# Patient Record
Sex: FEMALE | URBAN - METROPOLITAN AREA
[De-identification: names, ages, dates, MRNs, and addresses within clinical notes are randomized per-mention and may not be internally consistent; named-entity substitution may affect disease eponyms.]

---

## 2021-04-21 ENCOUNTER — APPOINTMENT (OUTPATIENT)
Dept: URBAN - METROPOLITAN AREA CLINIC 259 | Age: 76
Setting detail: DERMATOLOGY
End: 2021-04-21

## 2021-04-21 DIAGNOSIS — L57.0 ACTINIC KERATOSIS: ICD-10-CM

## 2021-04-21 DIAGNOSIS — Z71.89 OTHER SPECIFIED COUNSELING: ICD-10-CM

## 2021-04-21 DIAGNOSIS — L85.3 XEROSIS CUTIS: ICD-10-CM

## 2021-04-21 DIAGNOSIS — D18.0 HEMANGIOMA: ICD-10-CM

## 2021-04-21 DIAGNOSIS — L82.1 OTHER SEBORRHEIC KERATOSIS: ICD-10-CM

## 2021-04-21 DIAGNOSIS — L81.4 OTHER MELANIN HYPERPIGMENTATION: ICD-10-CM

## 2021-04-21 DIAGNOSIS — L56.5 DISSEMINATED SUPERFICIAL ACTINIC POROKERATOSIS (DSAP): ICD-10-CM

## 2021-04-21 DIAGNOSIS — L57.8 OTHER SKIN CHANGES DUE TO CHRONIC EXPOSURE TO NONIONIZING RADIATION: ICD-10-CM

## 2021-04-21 DIAGNOSIS — D22 MELANOCYTIC NEVI: ICD-10-CM

## 2021-04-21 PROBLEM — D22.5 MELANOCYTIC NEVI OF TRUNK: Status: ACTIVE | Noted: 2021-04-21

## 2021-04-21 PROBLEM — D18.01 HEMANGIOMA OF SKIN AND SUBCUTANEOUS TISSUE: Status: ACTIVE | Noted: 2021-04-21

## 2021-04-21 PROCEDURE — OTHER MIPS QUALITY: OTHER

## 2021-04-21 PROCEDURE — 99214 OFFICE O/P EST MOD 30 MIN: CPT | Mod: 25

## 2021-04-21 PROCEDURE — 17000 DESTRUCT PREMALG LESION: CPT

## 2021-04-21 PROCEDURE — OTHER COUNSELING: OTHER

## 2021-04-21 PROCEDURE — OTHER LIQUID NITROGEN: OTHER

## 2021-04-21 PROCEDURE — 17003 DESTRUCT PREMALG LES 2-14: CPT

## 2021-04-21 ASSESSMENT — LOCATION SIMPLE DESCRIPTION DERM
LOCATION SIMPLE: RIGHT FOREHEAD
LOCATION SIMPLE: RIGHT UPPER BACK
LOCATION SIMPLE: UPPER BACK
LOCATION SIMPLE: LEFT CALF
LOCATION SIMPLE: LEFT CHEEK
LOCATION SIMPLE: LEFT NOSE
LOCATION SIMPLE: LEFT FOREARM
LOCATION SIMPLE: LEFT UPPER BACK

## 2021-04-21 ASSESSMENT — LOCATION ZONE DERM
LOCATION ZONE: TRUNK
LOCATION ZONE: NOSE
LOCATION ZONE: ARM
LOCATION ZONE: FACE
LOCATION ZONE: LEG

## 2021-04-21 ASSESSMENT — LOCATION DETAILED DESCRIPTION DERM
LOCATION DETAILED: LEFT VENTRAL LATERAL PROXIMAL FOREARM
LOCATION DETAILED: LEFT CENTRAL MALAR CHEEK
LOCATION DETAILED: LEFT NASAL SIDEWALL
LOCATION DETAILED: RIGHT SUPERIOR MEDIAL UPPER BACK
LOCATION DETAILED: INFERIOR THORACIC SPINE
LOCATION DETAILED: LEFT INFERIOR MEDIAL UPPER BACK
LOCATION DETAILED: LEFT VENTRAL PROXIMAL FOREARM
LOCATION DETAILED: LEFT PROXIMAL CALF
LOCATION DETAILED: LEFT MEDIAL UPPER BACK
LOCATION DETAILED: RIGHT INFERIOR LATERAL FOREHEAD

## 2021-04-21 NOTE — PROCEDURE: MIPS QUALITY
Quality 130: Documentation Of Current Medications In The Medical Record: Current Medications Documented
Detail Level: Detailed
Quality 431: Preventive Care And Screening: Unhealthy Alcohol Use - Screening: Patient screened for unhealthy alcohol use using a single question and scores less than 2 times per year
Quality 226: Preventive Care And Screening: Tobacco Use: Screening And Cessation Intervention: Patient screened for tobacco use and is an ex/non-smoker
Quality 111:Pneumonia Vaccination Status For Older Adults: Pneumococcal Vaccination not Administered or Previously Received, Reason not Otherwise Specified
Quality 110: Preventive Care And Screening: Influenza Immunization: Influenza Immunization Ordered or Recommended, but not Administered due to system reason

## 2021-04-21 NOTE — PROCEDURE: LIQUID NITROGEN
Duration Of Freeze Thaw-Cycle (Seconds): 1
Detail Level: Detailed
Render Note In Bullet Format When Appropriate: No
Number Of Freeze-Thaw Cycles: 1 freeze-thaw cycle
Consent: The patient's consent was obtained including but not limited to risks of crusting, scabbing, blistering, scarring, darker or lighter pigmentary change, recurrence, incomplete removal and infection.
Post-Care Instructions: I reviewed with the patient in detail post-care instructions. Patient is to wear sunprotection, and avoid picking at any of the treated lesions. Pt may apply Vaseline to crusted or scabbing areas.

## 2022-05-11 ENCOUNTER — APPOINTMENT (OUTPATIENT)
Dept: URBAN - METROPOLITAN AREA CLINIC 259 | Age: 77
Setting detail: DERMATOLOGY
End: 2022-05-11

## 2022-05-11 DIAGNOSIS — L81.4 OTHER MELANIN HYPERPIGMENTATION: ICD-10-CM

## 2022-05-11 DIAGNOSIS — L82.1 OTHER SEBORRHEIC KERATOSIS: ICD-10-CM

## 2022-05-11 DIAGNOSIS — L82.0 INFLAMED SEBORRHEIC KERATOSIS: ICD-10-CM

## 2022-05-11 DIAGNOSIS — D18.0 HEMANGIOMA: ICD-10-CM

## 2022-05-11 DIAGNOSIS — L57.8 OTHER SKIN CHANGES DUE TO CHRONIC EXPOSURE TO NONIONIZING RADIATION: ICD-10-CM

## 2022-05-11 DIAGNOSIS — L57.0 ACTINIC KERATOSIS: ICD-10-CM

## 2022-05-11 DIAGNOSIS — L71.8 OTHER ROSACEA: ICD-10-CM

## 2022-05-11 DIAGNOSIS — Z71.89 OTHER SPECIFIED COUNSELING: ICD-10-CM

## 2022-05-11 DIAGNOSIS — D22 MELANOCYTIC NEVI: ICD-10-CM

## 2022-05-11 PROBLEM — D18.01 HEMANGIOMA OF SKIN AND SUBCUTANEOUS TISSUE: Status: ACTIVE | Noted: 2022-05-11

## 2022-05-11 PROBLEM — D22.5 MELANOCYTIC NEVI OF TRUNK: Status: ACTIVE | Noted: 2022-05-11

## 2022-05-11 PROCEDURE — 99214 OFFICE O/P EST MOD 30 MIN: CPT | Mod: 25

## 2022-05-11 PROCEDURE — 17110 DESTRUCT B9 LESION 1-14: CPT

## 2022-05-11 PROCEDURE — OTHER COUNSELING: OTHER

## 2022-05-11 PROCEDURE — OTHER LIQUID NITROGEN: OTHER

## 2022-05-11 PROCEDURE — OTHER MIPS QUALITY: OTHER

## 2022-05-11 PROCEDURE — OTHER PRESCRIPTION MEDICATION MANAGEMENT: OTHER

## 2022-05-11 PROCEDURE — 17000 DESTRUCT PREMALG LESION: CPT | Mod: 59

## 2022-05-11 ASSESSMENT — LOCATION SIMPLE DESCRIPTION DERM
LOCATION SIMPLE: RIGHT SHOULDER
LOCATION SIMPLE: RIGHT UPPER BACK
LOCATION SIMPLE: CHEST
LOCATION SIMPLE: UPPER BACK
LOCATION SIMPLE: LEFT UPPER BACK
LOCATION SIMPLE: RIGHT CHEEK

## 2022-05-11 ASSESSMENT — LOCATION DETAILED DESCRIPTION DERM
LOCATION DETAILED: RIGHT MEDIAL SUPERIOR CHEST
LOCATION DETAILED: RIGHT SUPERIOR MEDIAL UPPER BACK
LOCATION DETAILED: INFERIOR THORACIC SPINE
LOCATION DETAILED: LEFT INFERIOR MEDIAL UPPER BACK
LOCATION DETAILED: RIGHT ANTERIOR SHOULDER
LOCATION DETAILED: RIGHT MEDIAL MALAR CHEEK
LOCATION DETAILED: LEFT MEDIAL UPPER BACK

## 2022-05-11 ASSESSMENT — LOCATION ZONE DERM
LOCATION ZONE: TRUNK
LOCATION ZONE: FACE
LOCATION ZONE: ARM

## 2022-05-11 NOTE — PROCEDURE: LIQUID NITROGEN
Duration Of Freeze Thaw-Cycle (Seconds): 0
Consent: The patient's consent was obtained including but not limited to risks of crusting, scabbing, blistering, scarring, darker or lighter pigmentary change, recurrence, incomplete removal and infection.
Render In Bullet Format When Appropriate: No
Spray Paint Text: The liquid nitrogen was applied to the skin utilizing a spray paint frosting technique.
Medical Necessity Clause: This procedure was medically necessary because the lesions that were treated were:
Post-Care Instructions: I reviewed with the patient in detail post-care instructions. Patient is to wear sunprotection, and avoid picking at any of the treated lesions. Pt may apply Vaseline to crusted or scabbing areas.
Total Number Of Lesions Treated: 10
Render Post Care In The Note?: yes
Medical Necessity Information: It is in your best interest to select a reason for this procedure from the list below. All of these items fulfill various CMS LCD requirements except the new and changing color options.
Detail Level: Generalized
Number Of Freeze-Thaw Cycles: 1 freeze-thaw cycle
Duration Of Freeze Thaw-Cycle (Seconds): 1
Detail Level: Zone

## 2022-05-11 NOTE — PROCEDURE: PRESCRIPTION MEDICATION MANAGEMENT
Render In Strict Bullet Format?: No
Detail Level: Zone
Plan: Patient can call clinic for metrocream as needed.

## 2022-05-11 NOTE — PROCEDURE: MIPS QUALITY
Quality 431: Preventive Care And Screening: Unhealthy Alcohol Use - Screening: Patient not identified as an unhealthy alcohol user when screened for unhealthy alcohol use using a systematic screening method
Quality 110: Preventive Care And Screening: Influenza Immunization: Influenza Immunization Ordered or Recommended, but not Administered due to system reason
Quality 130: Documentation Of Current Medications In The Medical Record: Current Medications Documented
Detail Level: Generalized
Quality 226: Preventive Care And Screening: Tobacco Use: Screening And Cessation Intervention: Patient screened for tobacco use and is an ex/non-smoker

## 2023-09-17 ENCOUNTER — TRANSCRIBE ORDERS (OUTPATIENT)
Dept: OTHER | Age: 78
End: 2023-09-17

## 2023-09-17 DIAGNOSIS — D05.11 DUCTAL CARCINOMA IN SITU (DCIS) OF RIGHT BREAST: Primary | ICD-10-CM

## 2023-09-18 ENCOUNTER — PATIENT OUTREACH (OUTPATIENT)
Dept: ONCOLOGY | Facility: CLINIC | Age: 78
End: 2023-09-18
Payer: COMMERCIAL

## 2023-09-18 NOTE — PROGRESS NOTES
New Patient Oncology Nurse Navigator Note     Referring provider: Dr. Chichi Roche     Referring Clinic/Organization: [facility]    Referred to (specialty:) Radiation Oncology      Date Referral Received: September 18, 2023     Evaluation for:  D05.11 (ICD-10-CM) - Ductal carcinoma in situ (DCIS) of right breast     Clinical History (per Nurse review of records provided):      Tricia had bilateral screening mammograms on 8/16/23 and calcifications were identified in the upper central breast at posterior depth 7 cm from the nipple. On diagnostic mammogram 8/23/23 spot magnification views of the RIGHT breast demonstrate loosely grouped 2.8 x 2.5 cm pleomorphic microcalcifications in the RIGHT breast at the 12 o'clock position 6.6 cm from the nipple.       8/23/23 - Case: [id number]  A) RIGHT BREAST, 12:00, 6.6 CM FROM NIPPLE, STEREOTACTIC-GUIDED CORE BIOPSY:   1. Ductal carcinoma in situ (DCIS)      a. Subtype: Cribriform and Solid               b. Nuclear ndgndrndanddndend:nd nd2nd of 3      c. Calcifications: Are associated with DCIS      d. Necrosis: Is associated with DCIS   2. Focus suspicious but not diagnostic for microinvasion, see comment   3. Breast Ancillary Testing:        a. Estrogen receptor: Negative by manual morphometry (see comment)       b. Recommend repeat estrogen receptor testing on excision     A lumpectomy is planned with Dr. Roche on 9/26/23. Follow-up with Surgeon 10/3/23. Patient was advised by Dr. Roche and team to meet with radiation 4 weeks post surgery (week of 10/16) but patient does not wish to start radiation until she returns from her [facility] in November.     9/26/23 - Case: [id number]  A) RIGHT BREAST, PARTIAL MASTECTOMY:   1. Microinvasive ductal carcinoma, single focus        a. Size <0.5 mm   2. Extensive ductal carcinoma in situ (DCIS), nuclear grade 3, solid and comedo types with necrosis and calcifications       a. Size: 49 mm   3. Margins:       a. Invasive carcinoma is > 10 mm  from all margins       b. DCIS Margins:          - < 1 mm from the medial and lateral margins          - 1 mm from the superior and posterior margins (see comment)          - 1.5 mm from the anterior margin          - See parts B-F for final margin status   4. Breast Ancillary Testing: Performed on prior case on DCIS ([id number])       a. Estrogen receptor: Negative in DCIS                b. Insufficient tissue for additional marker testing in area of microinvasion (block A6):   5. Background breast tissue with stromal fibrosis and atypical lobular hyperplasia (ALH)     B) RIGHT BREAST, ADDITIONAL ANTERIOR MARGIN, RE-EXCISION:   1. Fibroadipose tissue   2. Negative for carcinoma (margin negative)     C) RIGHT BREAST, ADDITIONAL INFERIOR MARGIN, RE-EXCISION:   1. Breast tissue with stromal fibrosis   2. Negative for carcinoma (margin negative)     D) RIGHT BREAST, ADDITIONAL MEDIAL MARGIN, RE-EXCISION:   1. Breast tissue with stromal fibrosis   2. Negative for carcinoma (margin negative)     E) RIGHT BREAST, ADDITIONAL LATERAL MARGIN, RE-EXCISION:   1. Breast tissue with stromal fibrosis   2. Negative for carcinoma (margin negative)     F) RIGHT BREAST, ADDITIONAL SUPERIOR MARGIN, RE-EXCISION:   1. Breast tissue   2. Negative for carcinoma (margin negative)     2016  NHL: B cell lymphoma, stage 1A, CD20 positive folicular, isolated left inginal node, dx 2016    Overview:    Formatting of this note is different from the original.   12/20/2016 OV for 1 mo left groin painless nodule + new anemia dx at her intended 3 mo blood donation   12/27/2016 u/s core bx: follicular lymphoma, WHO grade 1-2 in that sampling   1/2017 consult Dr Nav RENEE   1/3/2017 staging PET CT skull base to mid thigh w/ mildly enlarged left ing node (SUV max 4.6), o/w nl   1/13/2017 staging BM bx right lat iliac crest: negative for lymphoma, normocellular, decr iron storage   1/13/2017 peripheral blood microcytic hypochromic anemia most c/w  OFELIA   4/14/2017 f/u onc: observe given low grade histology, no change on exam, f/u 6 mo     Records Location: Care Everywhere, Media, and See Bookmarked material     Records Needed:   Breast imaging for past 5 years     9/19 8:37 - Telephoned and spoke with Tricia. Dr. Roche's team has indicated she will not be referred to medical oncology due to current diagnosis of DCIS, ER negative status. She will move back into her home in [place] from [facility] on 11/1. Writer received referral, reviewed for appropriate plan, and call transferred to New Patient Scheduling for completion. Writer received referral, reviewed for appropriate plan, and call transferred to New Patient Scheduling for completion.    10/5 - Lumpectomy revealed microinvasive ductal carcinoma. She did meet with Dr. Roche for follow up on 10/3 and medical oncology referral deferred given ER- DCIS and <0.5 mm of microinvasive disease.

## 2023-10-26 NOTE — TELEPHONE ENCOUNTER
MEDICAL RECORDS REQUEST   Radiation Oncology  [address] [place] [address]  Fax: [phone]          FUTURE VISIT INFORMATION                                                   Tricia STEPHEN Todd, : 1945 scheduled for future visit at [facility] Radiation Oncology    RECORDS REQUESTED FOR VISIT                                                     Action 2023 10:00 AM    Action Taken Request is faxed to Amy for images.      Imaging Received  2023 12:51 PM    Action: Images from [facility] received. I emailed [facility] Film room to resolve to PACS.     BREAST     OFFICE NOTE from medical oncologist [facility] 10/3/23: Dr. Chichi Roche   OPERATIVE/BREAST BIOPSY REPORTS [facility] 23: lumpectomy  23: Breast Bx   [facility]    LABS     PATHOLOGY REPORTS Report in [facility] 23: [id number]   23: [id number]    ANYTHING RELATED TO DIAGNOSIS [facility]- [facility] 23   IMAGING (NEED IMAGES & REPORT)     MAMMO/SURGICAL BREAST IMGS/SPECIMEN RADIOGRAPH (Img req from [facility]) [facility]:   23, 23, 6/3/22. 21, 19, 18   XRAYS (Img req from [facility]) [facility]:  23: XR Breast  23: XR Breast Bx

## 2023-11-02 NOTE — PROGRESS NOTES
Dear Colleagues,  Today Tricia Brooks was seen in consultation.  IDENTIFICATION: This is a 78 year old woman with h/o NH Bcell Lymphoma of the left groin and newly diagnosed right breast microinvasive ductal carcinoma and ER- G3 DCIS ([id number], ER-), status post lumpectomy only referred for adjuvant radiation therapy.   HISTORY OF PRESENT ILLNESS: Tricia Brooks was in her usual state of health this past year.  On 2023 bilateral screening mammogram showed right breast calcifications.  On 2023 right diagnostic mammogram showed a 2.8 cm grouped pleomorphic microcalcifications.  Same-day stereotactic guided core biopsy was consistent with grade 3 DCIS with a focus suspicious for microinvasion, ER negative.  On 2023 Dr. Roche took her to the OR and she had a right wire localized lumpectomy with no sentinel lymph node biopsy.  Pathology revealed 0.5 mm of microinvasive ductal carcinoma with extensive grade 3 DCIS measuring 4.9 cm.  There were negative invasive margins and close (< 1mm to 1mm) medial, lateral and posterior margins. Specimen radiograph demonstrates the presence of the targeted calcifications, two wires and the biopsy clip.   Her postoperative course has been unenventful.   Since her surgery, she has continued to heal well and denies any significant pain.  She has good ROM.  She denies any other new masses, skin changes, shortness of breath, chest or bony pain, or new neurologic symptoms. She is being seen here today for consideration of postoperative radiotherapy.  REVIEW OF SYSTEMS: As per HPI, a 14-point review of system is otherwise negative.  PAST RADIATION THERAPY:  Denies.  PAST CTD/PACEMAKER: Denies  BREAST RISK FACTORS:  No history of prior breast biopsy. No family history of ovarian cancer. Maternal aunt had breast cancer.  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  No adjuvant treatment.  She started her menses at age 16.    "with her first delivery at age 31. She  for a total of 2 years. Postmenopausal. History of HRT 10-15 years. OCP use 4-5 years.    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 baseline creatinine nl, urine (-)protein 2013 transiently abnl eGFR, repeat GFR nl.  Urine micro nl.  Umicroalb nl.  24 hr ABPM    Decreased GFR 11/07/2023    Formatting of this note is different from the original. 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 (1) 1998 EGD; Rx HPylori (2) 12/2016:  anemia dx at the time of her usual 90 day donation, had been nl at 8/2016 donation.  Ferritin low.  Following o/w nl evaluation, attributed to blood donation - 1/9/2017 colonoscopy and EGD w/ SB bx normal ([facility], Dr Marlene Burkett)  No fam hx CRC - 1/13/2017 BM bx and peripheral blood c/w iron depletion on     Gross hematuria 11/07/2023    Formatting of this note might be different from the original. 12/2020 phone appt Dr Crow following 2 days c/w gross hematuria.  UA after signs cleared normal 4/2021 CTU+cysto w/ Dr Stark unrevealing    History of pneumonia 11/07/2023    Formatting of this note might be different from the original.    Hyperlipidemia 11/07/2023    Formatting of this note might be different from the original. Dx 33 yo.  Started pharmcorx @ 53 yo 4/02 EBCT \" nl;\"   10/02 exercise stress test \"nl\" mother MI (d) 57 or 59 yo sister MI (d) 53 yo-tobacco brother (d) MI 80 yo-tobacco brother (d) MI 63 yo    Hypertension 11/07/2023    Formatting of this note is different from the original.   2000 dx w/ her [facility] PMD and started Lotrel 5/20 at that time   2013 " "ABPM 24 hr 20% dipping, stop HCTZ and continue ACEi    NHL (non-Hodgkin's lymphoma) (H) 11/07/2023    Formatting of this note is different from the original.   12/20/2016 OV for 1 mo left groin painless nodule + new anemia dx at her intended 3 mo blood donation     12/27/2016 u/s core bx: follicular lymphoma, WHO grade 1-2 in that sampling   1/2017 consult Dr Nav RENEE   1/3/2017 staging PET CT skull base to mid thigh w/ mildly enlarged left ing node (SUV max 4.6), o/w nl   1/13/2017     Paroxysmal atrial fibrillation (H) 08/17/2023    Pulsatile tinnitus 06/02/2018    Formatting of this note might be different from the original. Onset~68 yo. ENT, audiology eval unrevealing, worse when supine       Past Surgical History:   Procedure Laterality Date    IR LYMPH NODE BIOPSY  12/27/2016       No family history on file.    Social History     Tobacco Use    Smoking status: Never    Smokeless tobacco: Never   Substance Use Topics    Alcohol use: Yes     Comment: 1 drink per day     Current Outpatient Medications   Medication    atorvastatin (LIPITOR) 40 MG tablet    benazepril (LOTENSIN) 10 MG tablet    Cholecalciferol (VITAMIN D-3 PO)    Cyanocobalamin (VITAMIN B-12 PO)    ELIQUIS ANTICOAGULANT 5 MG tablet    gabapentin (NEURONTIN) 300 MG capsule    Glucosamine-Chondroitin (GLUCOSAMINE CHONDR COMPLEX PO)    SODIUM FLUORIDE 5000 PPM 1.1 % PSTE dental paste    vitamin C (ASCORBIC ACID) 500 MG tablet     No current facility-administered medications for this visit.      No Known Allergies  PHYSICAL EXAMINATION:  BP (!) 157/79   Pulse 58   Temp 98  F (36.7  C) (Oral)   Ht 1.575 m (5' 2\")   Wt 49.2 kg (108 lb 6.4 oz)   SpO2 94%   BMI 19.83 kg/m    GENERAL Well-appearing woman in no acute distress.  HEENT Normocephalic, atraumatic.  Sclerae anicteric.  CVR  Regular rate and rhythm.  No murmurs, rubs, or gallops.  LUNGS Clear to auscultation bilaterally.  BREASTS Breasts are examined in the supine and upright " position.  The breasts are symmetric.  The left breast is unremarkable, as there is no erythema, ulceration or suspicious nodularity within it.  Within the right breast there is a well healed incision.  Firmness of this incision is not suspicious.  There is no suspicious erythema, ulceration or nodularity within the right breast.  There is no erythema, retraction, desquamation or discharge appreciated within the right or left nipple areolar complex.    LYMPH No supraclavicular, infraclavicular, or axillary lymphadenopathy appreciated bilaterally.  ABDOMEN Soft.    EXT  No clubbing or cyanosis or edema.    NEURO No focal deficits.  MSK  Good ROM.   SKIN  Warm and well perfused.    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  G3 DCIS was excised with multiple close margins.  I recommend adjuvant XRT to improve local control.  The risks, benefits, treatment rationale and regimen of radiation therapy to the breast were discussed in great detail today with the patient and her .  Risks include but are not limited to skin erythema, desquamation, hyperpigmentation, breast and lymphedema, fibrosis, telengectasia, pneumonitis, cardiac toxicity, rib fracture and secondary malignancy. The patient consented to therapy and will be scheduled for a CT simulation. Treatment will start shortly afterwards.   Additional problem list to be addressed in the following manner:  Systemic/hormonal treatment : ER- noninvasive disease and small microinvasive tumor.  No need for Med Onc referral.  After completing XRT pt to continue follow up with Surgery for surveillance imaging.  There was ample time for questions and all were answered to the patient's  satisfaction. Thank you for allowing me to participate in the care of this pleasant patient. If you have any questions, please do not hesitate to contact my office.    Sincerely,  Kali Khalil MD

## 2023-11-07 ENCOUNTER — OFFICE VISIT (OUTPATIENT)
Dept: RADIATION ONCOLOGY | Facility: CLINIC | Age: 78
End: 2023-11-07
Attending: SURGERY
Payer: COMMERCIAL

## 2023-11-07 ENCOUNTER — PRE VISIT (OUTPATIENT)
Dept: RADIATION ONCOLOGY | Facility: CLINIC | Age: 78
End: 2023-11-07

## 2023-11-07 VITALS
HEART RATE: 58 BPM | SYSTOLIC BLOOD PRESSURE: 157 MMHG | WEIGHT: 108.4 LBS | HEIGHT: 62 IN | OXYGEN SATURATION: 94 % | DIASTOLIC BLOOD PRESSURE: 79 MMHG | TEMPERATURE: 98 F | BODY MASS INDEX: 19.95 KG/M2

## 2023-11-07 DIAGNOSIS — Z17.1 MALIGNANT NEOPLASM OF UPPER-OUTER QUADRANT OF RIGHT BREAST IN FEMALE, ESTROGEN RECEPTOR NEGATIVE (H): Primary | ICD-10-CM

## 2023-11-07 DIAGNOSIS — C50.411 MALIGNANT NEOPLASM OF UPPER-OUTER QUADRANT OF RIGHT BREAST IN FEMALE, ESTROGEN RECEPTOR NEGATIVE (H): Primary | ICD-10-CM

## 2023-11-07 PROBLEM — E78.5 HYPERLIPIDEMIA: Status: ACTIVE | Noted: 2023-11-07

## 2023-11-07 PROBLEM — I10 HYPERTENSION: Status: ACTIVE | Noted: 2023-11-07

## 2023-11-07 PROBLEM — I48.0 PAROXYSMAL ATRIAL FIBRILLATION (H): Status: ACTIVE | Noted: 2023-08-17

## 2023-11-07 PROBLEM — R31.0 GROSS HEMATURIA: Status: ACTIVE | Noted: 2023-11-07

## 2023-11-07 PROBLEM — R94.4 DECREASED GFR: Status: ACTIVE | Noted: 2023-11-07

## 2023-11-07 PROBLEM — H93.A9 PULSATILE TINNITUS: Status: ACTIVE | Noted: 2018-06-02

## 2023-11-07 PROBLEM — D05.11 DUCTAL CARCINOMA IN SITU (DCIS) OF RIGHT BREAST: Status: ACTIVE | Noted: 2023-09-05

## 2023-11-07 PROBLEM — C85.90 NHL (NON-HODGKIN'S LYMPHOMA) (H): Status: ACTIVE | Noted: 2023-11-07

## 2023-11-07 PROBLEM — K29.90 GASTRITIS AND GASTRODUODENITIS: Status: ACTIVE | Noted: 2023-11-07

## 2023-11-07 PROBLEM — K29.70 GASTRITIS AND GASTRODUODENITIS: Status: ACTIVE | Noted: 2023-11-07

## 2023-11-07 PROBLEM — C82.80: Status: ACTIVE | Noted: 2017-05-18

## 2023-11-07 PROBLEM — N00.8 ACUTE GLOMERULONEPHRITIS WITH LESION OF PROLIFERATIVE GLOMERULONEPHRITIS: Status: ACTIVE | Noted: 2023-11-07

## 2023-11-07 PROBLEM — Z87.01 HISTORY OF PNEUMONIA: Status: ACTIVE | Noted: 2023-11-07

## 2023-11-07 PROCEDURE — 99205 OFFICE O/P NEW HI 60 MIN: CPT | Performed by: RADIOLOGY

## 2023-11-07 RX ORDER — GABAPENTIN 300 MG/1
300 CAPSULE ORAL AT BEDTIME
COMMUNITY

## 2023-11-07 RX ORDER — ATORVASTATIN CALCIUM 40 MG/1
40 TABLET, FILM COATED ORAL DAILY
COMMUNITY
Start: 2023-08-17

## 2023-11-07 RX ORDER — BENAZEPRIL HYDROCHLORIDE 10 MG/1
10 TABLET ORAL DAILY
COMMUNITY
Start: 2023-08-17

## 2023-11-07 RX ORDER — SODIUM FLUORIDE 6 MG/ML
PASTE, DENTIFRICE DENTAL
COMMUNITY
Start: 2023-10-09

## 2023-11-07 RX ORDER — APIXABAN 5 MG/1
1 TABLET, FILM COATED ORAL 2 TIMES DAILY
COMMUNITY
Start: 2023-09-08

## 2023-11-07 RX ORDER — ASCORBIC ACID 500 MG
500 TABLET ORAL DAILY
COMMUNITY
Start: 2022-06-15

## 2023-11-07 ASSESSMENT — PAIN SCALES - GENERAL: PAINLEVEL: NO PAIN (0)

## 2023-11-07 NOTE — NURSING NOTE
"REASON FOR APPOINTMENT   Type of Cancer: R breast DCIS ER-, <0.5 microinvasive IDC  Location: R breast  Date of Symptom Onset: 23 Bilateral screening mammogram    TREATMENT TO-DATE FOR THIS CANCER  Surgery ? 23 wire bracketed R breast lumpectomy Dr. Roche   Chemotherapy ? no   Other Treatments for this Cancer ? no    PERSONAL HISTORY OF CANCER   Previous Cancer ? no   Prior Radiation ? no   Prior Chemotherapy ? no   Prior Hormonal Therapy ? no     REFERRALS NEEDED  No    VITALS  BP (!) 157/79   Pulse 58   Temp 98  F (36.7  C) (Oral)   Ht 1.575 m (5' 2\")   Wt 49.2 kg (108 lb 6.4 oz)   SpO2 94%   BMI 19.83 kg/m      PACEMAKER/IMPLANTED CARDIAC DEVICE no    PAIN  Denies    PSYCHOSOCIAL  Marital Status:   Patient lives in home with spouse.  Number of children: 1  Working status: Retired  Do you feel safe in your home? Yes    REVIEW OF SYSTEMS  Skin: negative  Eyes: positive for glasses  Ears/Nose/Throat: positive for hearing loss, tinnitus  Respiratory: No shortness of breath, dyspnea on exertion, cough, or hemoptysis  Cardiovascular: negative  Gastrointestinal: negative  Genitourinary: negative  Musculoskeletal: positive for back pain  Neurologic: positive for numbness or tingling of L leg  Psychiatric: negative  Hematologic/Lymphatic/Immunologic: negative  Endocrine: negative    WOMEN ONLY  Any chance you may be pregnant: No  Age at first period: 16  Date of last period: post menopausal late 50's  Number of pregnancies: 1  Live Births: 1  Age at 1st delivery: 31  Breastfeedin years  Birth Control pills: Yes  If yes, for how lon-5 years  HRT: 10-15 years    Radiation Oncology Patient Teaching    Current Concern: R breast DCIS    Person involved with teaching: Patient and   Patient asked Questions: Yes  Patient was cooperative: Yes  Patient was receptive (willing to accept information given): Yes    Education Assessment  Comprehension ability: High  Knowledge level: Medium  Factors " affecting teaching: None    Education Materials Given  Caring for Your Skin During Radiation, Aquaphor, Fatigue    Educational Topics Discussed  Side effects- see above    Response To Teaching  Verbalizes understanding    Do you have an advanced directive or living will? No  Are you DNR/DNI? No    Rhonda Moeller RN

## 2023-11-07 NOTE — LETTER
11/7/2023         RE: Tricia Brooks  [address]  [place] [address]        Dear Colleague,    Thank you for referring your patient, Tricia Brooks, to the [facility] RADIATION ONCOLOGY [place]. Please see a copy of my visit note below.    Dear Colleagues,  Today Tricia Brooks was seen in consultation.  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   HISTORY OF PRESENT ILLNESS: Tricia Brooks was in her usual state of health this past year.  On August 16 2023 bilateral screening mammogram showed right breast calcifications.  On August 23, 2023 right diagnostic mammogram showed a 2.8 cm grouped pleomorphic microcalcifications.  Same-day stereotactic guided core biopsy was consistent with grade 3 DCIS with a focus suspicious for microinvasion, ER negative.  On September 26, 2023 Dr. Roche took her to the OR and she had a right wire localized lumpectomy with no sentinel lymph node biopsy.  Pathology revealed 0.5 mm of microinvasive ductal carcinoma with extensive grade 3 DCIS measuring 4.9 cm.  There were negative invasive margins and close (< 1mm to 1mm) medial, lateral and posterior margins. Specimen radiograph demonstrates the presence of the targeted calcifications, two wires and the biopsy clip.   Her postoperative course has been unenventful.   Since her surgery, she has continued to heal well and denies any significant pain.  She has good ROM.  She denies any other new masses, skin changes, shortness of breath, chest or bony pain, or new neurologic symptoms. She is being seen here today for consideration of postoperative radiotherapy.  REVIEW OF SYSTEMS: As per HPI, a 14-point review of system is otherwise negative.  PAST RADIATION THERAPY:  Denies.  PAST CTD/PACEMAKER: Denies  BREAST RISK  "FACTORS:  No history of prior breast biopsy. No family history of ovarian cancer. Maternal aunt had breast cancer.  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  No adjuvant treatment.  She started her menses at age 16.   with her first delivery at age 31. She  for a total of 2 years. Postmenopausal. History of HRT 10-15 years. OCP use 4-5 years.    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 baseline creatinine nl, urine (-)protein  transiently abnl eGFR, repeat GFR nl.  Urine micro nl.  Umicroalb nl.  24 hr ABPM     Decreased GFR 2023    Formatting of this note is different from the original.   GFR abnl while on Aleve for 1.5 mo (neck pain)     Ductal carcinoma in situ (DCIS) of right breast 2023     Follicular low grade B-cell lymphoma (H) 2017     Gastritis and gastroduodenitis 2023    Formatting of this note is different from the original. (1)  EGD; Rx HPylori (2) 2016:  anemia dx at the time of her usual 90 day donation, had been nl at 2016 donation.  Ferritin low.  Following o/w nl evaluation, attributed to blood donation - 2017 colonoscopy and EGD w/ SB bx normal ([facility], Dr Marlene Burkett)  No fam hx CRC - 2017 BM bx and peripheral blood c/w iron depletion on      Gross hematuria 2023    Formatting of this note might be different from the original. 2020 phone appt Dr Crow following 2 days c/w gross hematuria.  UA after signs cleared normal 2021 CTU+cysto w/ Dr Stark unrevealing     History of pneumonia 2023    Formatting of this note might be different from the original.     Hyperlipidemia 2023    Formatting of this note might be different from the original. Dx 31 yo.  Started pharmcorx @ 53 yo  EBCT \" nl;\"   10/02 exercise stress test \"nl\" mother " "MI (d) 57 or 59 yo sister MI (d) 53 yo-tobacco brother (d) MI 80 yo-tobacco brother (d) MI 63 yo     Hypertension 11/07/2023    Formatting of this note is different from the original.    2000 dx w/ her [facility] PMD and started Lotrel 5/20 at that time    2013 ABPM 24 hr 20% dipping, stop HCTZ and continue ACEi     NHL (non-Hodgkin's lymphoma) (H) 11/07/2023    Formatting of this note is different from the original.    12/20/2016 OV for 1 mo left groin painless nodule + new anemia dx at her intended 3 mo blood donation      12/27/2016 u/s core bx: follicular lymphoma, WHO grade 1-2 in that sampling    1/2017 consult Dr Nav RENEE    1/3/2017 staging PET CT skull base to mid thigh w/ mildly enlarged left ing node (SUV max 4.6), o/w nl    1/13/2017      Paroxysmal atrial fibrillation (H) 08/17/2023     Pulsatile tinnitus 06/02/2018    Formatting of this note might be different from the original. Onset~66 yo. ENT, audiology eval unrevealing, worse when supine       Past Surgical History:   Procedure Laterality Date     IR LYMPH NODE BIOPSY  12/27/2016       No family history on file.    Social History     Tobacco Use     Smoking status: Never     Smokeless tobacco: Never   Substance Use Topics     Alcohol use: Yes     Comment: 1 drink per day     Current Outpatient Medications   Medication     atorvastatin (LIPITOR) 40 MG tablet     benazepril (LOTENSIN) 10 MG tablet     Cholecalciferol (VITAMIN D-3 PO)     Cyanocobalamin (VITAMIN B-12 PO)     ELIQUIS ANTICOAGULANT 5 MG tablet     gabapentin (NEURONTIN) 300 MG capsule     Glucosamine-Chondroitin (GLUCOSAMINE CHONDR COMPLEX PO)     SODIUM FLUORIDE 5000 PPM 1.1 % PSTE dental paste     vitamin C (ASCORBIC ACID) 500 MG tablet     No current facility-administered medications for this visit.      No Known Allergies  PHYSICAL EXAMINATION:  BP (!) 157/79   Pulse 58   Temp 98  F (36.7  C) (Oral)   Ht 1.575 m (5' 2\")   Wt 49.2 kg (108 lb 6.4 oz)   SpO2 94%   BMI " 19.83 kg/m    GENERAL Well-appearing woman in no acute distress.  HEENT Normocephalic, atraumatic.  Sclerae anicteric.  CVR  Regular rate and rhythm.  No murmurs, rubs, or gallops.  LUNGS Clear to auscultation bilaterally.  BREASTS Breasts are examined in the supine and upright position.  The breasts are symmetric.  The left breast is unremarkable, as there is no erythema, ulceration or suspicious nodularity within it.  Within the right breast there is a well healed incision.  Firmness of this incision is not suspicious.  There is no suspicious erythema, ulceration or nodularity within the right breast.  There is no erythema, retraction, desquamation or discharge appreciated within the right or left nipple areolar complex.    LYMPH No supraclavicular, infraclavicular, or axillary lymphadenopathy appreciated bilaterally.  ABDOMEN Soft.    EXT  No clubbing or cyanosis or edema.    NEURO No focal deficits.  MSK  Good ROM.   SKIN  Warm and well perfused.    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  G3 DCIS was excised with multiple close margins.  I recommend adjuvant XRT to improve local control.  The risks, benefits, treatment rationale and regimen of radiation therapy to the breast were discussed in great detail today with the patient and her .  Risks include but are not limited to skin erythema, desquamation, hyperpigmentation, breast and lymphedema, fibrosis, telengectasia, pneumonitis, cardiac toxicity, rib fracture and secondary malignancy. The patient consented to therapy and will be scheduled for a CT simulation. Treatment will start shortly afterwards.   Additional problem list to be addressed in the following  manner:  Systemic/hormonal treatment : ER- noninvasive disease and small microinvasive tumor.  No need for Med Onc referral.  After completing XRT pt to continue follow up with Surgery for surveillance imaging.  There was ample time for questions and all were answered to the patient's satisfaction. Thank you for allowing me to participate in the care of this pleasant patient. If you have any questions, please do not hesitate to contact my office.    Sincerely,  Kali Khalil MD          Again, thank you for allowing me to participate in the care of your patient.        Sincerely,        JOSE ANTONIO Khalil MD

## 2023-11-13 ENCOUNTER — OFFICE VISIT (OUTPATIENT)
Dept: RADIATION ONCOLOGY | Facility: CLINIC | Age: 78
End: 2023-11-13
Payer: COMMERCIAL

## 2023-11-13 DIAGNOSIS — Z17.1 MALIGNANT NEOPLASM OF UPPER-OUTER QUADRANT OF RIGHT BREAST IN FEMALE, ESTROGEN RECEPTOR NEGATIVE (H): Primary | ICD-10-CM

## 2023-11-13 DIAGNOSIS — C50.411 MALIGNANT NEOPLASM OF UPPER-OUTER QUADRANT OF RIGHT BREAST IN FEMALE, ESTROGEN RECEPTOR NEGATIVE (H): Primary | ICD-10-CM

## 2023-11-13 PROCEDURE — 77290 THER RAD SIMULAJ FIELD CPLX: CPT | Performed by: RADIOLOGY

## 2023-11-13 PROCEDURE — 77332 RADIATION TREATMENT AID(S): CPT | Performed by: RADIOLOGY

## 2023-11-13 PROCEDURE — 77263 THER RADIOLOGY TX PLNG CPLX: CPT | Performed by: RADIOLOGY

## 2023-11-13 PROCEDURE — 99207 PR NO CHARGE LOS: CPT | Performed by: RADIOLOGY

## 2023-11-13 NOTE — LETTER
11/13/2023         RE: Tricia Brooks  [address]  [place] [address]        Dear Colleague,    Thank you for referring your patient, Tricia Brooks, to the [facility] RADIATION ONCOLOGY [place]. Please see a copy of my visit note below.    CT sim completed    AS      Again, thank you for allowing me to participate in the care of your patient.        Sincerely,        JOSE ANTONIO Khalil MD

## 2023-11-17 ENCOUNTER — APPOINTMENT (OUTPATIENT)
Dept: RADIATION ONCOLOGY | Facility: CLINIC | Age: 78
End: 2023-11-17
Payer: COMMERCIAL

## 2023-11-17 PROCEDURE — 77334 RADIATION TREATMENT AID(S): CPT | Performed by: RADIOLOGY

## 2023-11-17 PROCEDURE — 77295 3-D RADIOTHERAPY PLAN: CPT | Performed by: RADIOLOGY

## 2023-11-17 PROCEDURE — 77300 RADIATION THERAPY DOSE PLAN: CPT | Performed by: RADIOLOGY

## 2023-11-20 ENCOUNTER — APPOINTMENT (OUTPATIENT)
Dept: RADIATION ONCOLOGY | Facility: CLINIC | Age: 78
End: 2023-11-20
Payer: COMMERCIAL

## 2023-11-20 PROCEDURE — 77280 THER RAD SIMULAJ FIELD SMPL: CPT | Performed by: RADIOLOGY

## 2023-11-21 ENCOUNTER — APPOINTMENT (OUTPATIENT)
Dept: RADIATION ONCOLOGY | Facility: CLINIC | Age: 78
End: 2023-11-21
Payer: COMMERCIAL

## 2023-11-21 PROCEDURE — 77412 RADIATION TX DELIVERY LVL 3: CPT | Performed by: RADIOLOGY

## 2023-11-22 ENCOUNTER — APPOINTMENT (OUTPATIENT)
Dept: RADIATION ONCOLOGY | Facility: CLINIC | Age: 78
End: 2023-11-22
Payer: COMMERCIAL

## 2023-11-22 PROCEDURE — 77412 RADIATION TX DELIVERY LVL 3: CPT | Performed by: SURGERY

## 2023-11-24 ENCOUNTER — APPOINTMENT (OUTPATIENT)
Dept: RADIATION ONCOLOGY | Facility: CLINIC | Age: 78
End: 2023-11-24
Payer: COMMERCIAL

## 2023-11-24 PROCEDURE — 77412 RADIATION TX DELIVERY LVL 3: CPT | Performed by: SURGERY

## 2023-11-27 ENCOUNTER — OFFICE VISIT (OUTPATIENT)
Dept: RADIATION ONCOLOGY | Facility: CLINIC | Age: 78
End: 2023-11-27
Payer: COMMERCIAL

## 2023-11-27 ENCOUNTER — APPOINTMENT (OUTPATIENT)
Dept: RADIATION ONCOLOGY | Facility: CLINIC | Age: 78
End: 2023-11-27
Payer: COMMERCIAL

## 2023-11-27 VITALS
SYSTOLIC BLOOD PRESSURE: 135 MMHG | BODY MASS INDEX: 19.22 KG/M2 | OXYGEN SATURATION: 95 % | WEIGHT: 105.1 LBS | RESPIRATION RATE: 16 BRPM | DIASTOLIC BLOOD PRESSURE: 88 MMHG | TEMPERATURE: 98.5 F | HEART RATE: 68 BPM

## 2023-11-27 DIAGNOSIS — Z17.1 MALIGNANT NEOPLASM OF UPPER-OUTER QUADRANT OF RIGHT BREAST IN FEMALE, ESTROGEN RECEPTOR NEGATIVE (H): Primary | ICD-10-CM

## 2023-11-27 DIAGNOSIS — C50.411 MALIGNANT NEOPLASM OF UPPER-OUTER QUADRANT OF RIGHT BREAST IN FEMALE, ESTROGEN RECEPTOR NEGATIVE (H): Primary | ICD-10-CM

## 2023-11-27 PROBLEM — K29.90 GASTRITIS AND GASTRODUODENITIS: Status: RESOLVED | Noted: 2023-11-07 | Resolved: 2023-11-27

## 2023-11-27 PROBLEM — K29.90 GASTRITIS AND GASTRODUODENITIS: Chronic | Status: ACTIVE | Noted: 2023-11-07

## 2023-11-27 PROBLEM — K29.70 GASTRITIS AND GASTRODUODENITIS: Status: RESOLVED | Noted: 2023-11-07 | Resolved: 2023-11-27

## 2023-11-27 PROBLEM — K29.70 GASTRITIS AND GASTRODUODENITIS: Chronic | Status: ACTIVE | Noted: 2023-11-07

## 2023-11-27 PROCEDURE — 99207 PR DROP WITH A PROCEDURE: CPT | Performed by: RADIOLOGY

## 2023-11-27 PROCEDURE — 77412 RADIATION TX DELIVERY LVL 3: CPT | Performed by: RADIOLOGY

## 2023-11-27 ASSESSMENT — PAIN SCALES - GENERAL: PAINLEVEL: NO PAIN (0)

## 2023-11-27 NOTE — PROGRESS NOTES
"[facility] PHYSICIANS  SPECIALIZING IN BREAKTHROUGHS  Radiation Oncology    On Treatment Visit Note      Tricia Brooks      Date: 2023   MRN: [id number]   : 1945  Diagnosis: R breast DCIS ER-      Reason for Visit:  On Radiation Treatment Visit     Treatment Summary to Date  Treatment Site: R breast + bst Current Dose: 1064/5656 cGy Fractions:       Chemotherapy  Chemo concurrent with radx?: No (No Med/Onc ER-)    Subjective:     Early in treatment doing well with no unexpected side effects.    Nursing ROS:   Nutrition Alteration  Diet Type: Patient's Preference  Skin  Skin Reaction: 0 - No changes  Skin Intervention: Aquaphor BID        Cardiovascular  Respiratory effort: 1 - Normal - without distress        Psychosocial  Psychosocial Note: Patient denies fatigue  Pain Assessment  0-10 Pain Scale: 0      Objective:   /88   Pulse 68   Temp 98.5  F (36.9  C) (Oral)   Resp 16   Wt 47.7 kg (105 lb 1.6 oz)   SpO2 95%   BMI 19.22 kg/m    Gen: Appears well, in no acute distress  Skin: No erythema    Labs:  CBC RESULTS: No results for input(s): \"WBC\", \"RBC\", \"HGB\", \"HCT\", \"MCV\", \"MCH\", \"MCHC\", \"RDW\", \"PLT\" in the last 75208 hours.  ELECTROLYTES:  No results for input(s): \"NA\", \"POTASSIUM\", \"CHLORIDE\", \"RYAN\", \"CO2\", \"BUN\", \"CR\", \"GLC\" in the last 14345 hours.    Assessment:    Tolerating radiation therapy well.  All questions and concerns addressed.      Plan:   Continue current therapy.    Aquaphor 2-4 times per day      Mosaiq chart and setup information reviewed  Ports checked    Medication Review  Med list reviewed with patient?: Yes    Educational Topic Discussed  Education Instructions: Skin cares, fatigue        Kali Khalil MD    Please do not send letter to referring physician.      "

## 2023-11-27 NOTE — LETTER
"    2023         RE: Tricia Brooks  [address]  [place] [address]        Dear Colleague,    Thank you for referring your patient, Tricia Brooks, to the [facility] RADIATION ONCOLOGY [place]. Please see a copy of my visit note below.    [facility] PHYSICIANS  SPECIALIZING IN BREAKTHROUGHS  Radiation Oncology    On Treatment Visit Note      Tricia Brooks      Date: 2023   MRN: [id number]   : 1945  Diagnosis: R breast DCIS ER-      Reason for Visit:  On Radiation Treatment Visit     Treatment Summary to Date  Treatment Site: R breast + bst Current Dose: 1064/5656 cGy Fractions:       Chemotherapy  Chemo concurrent with radx?: No (No Med/Onc ER-)    Subjective:     Early in treatment doing well with no unexpected side effects.    Nursing ROS:   Nutrition Alteration  Diet Type: Patient's Preference  Skin  Skin Reaction: 0 - No changes  Skin Intervention: Aquaphor BID        Cardiovascular  Respiratory effort: 1 - Normal - without distress        Psychosocial  Psychosocial Note: Patient denies fatigue  Pain Assessment  0-10 Pain Scale: 0      Objective:   /88   Pulse 68   Temp 98.5  F (36.9  C) (Oral)   Resp 16   Wt 47.7 kg (105 lb 1.6 oz)   SpO2 95%   BMI 19.22 kg/m    Gen: Appears well, in no acute distress  Skin: No erythema    Labs:  CBC RESULTS: No results for input(s): \"WBC\", \"RBC\", \"HGB\", \"HCT\", \"MCV\", \"MCH\", \"MCHC\", \"RDW\", \"PLT\" in the last 30274 hours.  ELECTROLYTES:  No results for input(s): \"NA\", \"POTASSIUM\", \"CHLORIDE\", \"RYAN\", \"CO2\", \"BUN\", \"CR\", \"GLC\" in the last 72014 hours.    Assessment:    Tolerating radiation therapy well.  All questions and concerns addressed.      Plan:   Continue current therapy.    Aquaphor 2-4 times per day      Mosaiq chart and setup information reviewed  Ports checked    Medication Review  Med list reviewed with patient?: Yes    Educational Topic Discussed  Education Instructions: Skin cares, " sandie Khalil MD    Please do not send letter to referring physician.        Again, thank you for allowing me to participate in the care of your patient.        Sincerely,        JOSE ANTONIO Khalil MD

## 2023-11-28 ENCOUNTER — APPOINTMENT (OUTPATIENT)
Dept: RADIATION ONCOLOGY | Facility: CLINIC | Age: 78
End: 2023-11-28
Payer: COMMERCIAL

## 2023-11-28 PROCEDURE — 77417 THER RADIOLOGY PORT IMAGE(S): CPT | Performed by: RADIOLOGY

## 2023-11-28 PROCEDURE — 77336 RADIATION PHYSICS CONSULT: CPT | Performed by: RADIOLOGY

## 2023-11-28 PROCEDURE — 77427 RADIATION TX MANAGEMENT X5: CPT | Performed by: RADIOLOGY

## 2023-11-28 PROCEDURE — 77412 RADIATION TX DELIVERY LVL 3: CPT | Performed by: RADIOLOGY

## 2023-11-29 ENCOUNTER — APPOINTMENT (OUTPATIENT)
Dept: RADIATION ONCOLOGY | Facility: CLINIC | Age: 78
End: 2023-11-29
Payer: COMMERCIAL

## 2023-11-29 ENCOUNTER — OFFICE VISIT (OUTPATIENT)
Dept: RADIATION ONCOLOGY | Facility: CLINIC | Age: 78
End: 2023-11-29
Payer: COMMERCIAL

## 2023-11-29 VITALS
OXYGEN SATURATION: 96 % | RESPIRATION RATE: 16 BRPM | HEART RATE: 62 BPM | WEIGHT: 105 LBS | BODY MASS INDEX: 19.2 KG/M2 | DIASTOLIC BLOOD PRESSURE: 71 MMHG | TEMPERATURE: 97.8 F | SYSTOLIC BLOOD PRESSURE: 117 MMHG

## 2023-11-29 DIAGNOSIS — Z17.1 MALIGNANT NEOPLASM OF UPPER-OUTER QUADRANT OF RIGHT BREAST IN FEMALE, ESTROGEN RECEPTOR NEGATIVE (H): Primary | ICD-10-CM

## 2023-11-29 DIAGNOSIS — C50.411 MALIGNANT NEOPLASM OF UPPER-OUTER QUADRANT OF RIGHT BREAST IN FEMALE, ESTROGEN RECEPTOR NEGATIVE (H): Primary | ICD-10-CM

## 2023-11-29 PROCEDURE — 99207 PR DROP WITH A PROCEDURE: CPT | Performed by: RADIOLOGY

## 2023-11-29 PROCEDURE — 77412 RADIATION TX DELIVERY LVL 3: CPT | Performed by: RADIOLOGY

## 2023-11-29 ASSESSMENT — PAIN SCALES - GENERAL: PAINLEVEL: NO PAIN (0)

## 2023-11-29 NOTE — LETTER
"    2023         RE: Tricia Brooks  [address]  [place] [address]        Dear Colleague,    Thank you for referring your patient, Tricia Brooks, to the [facility] RADIATION ONCOLOGY [place]. Please see a copy of my visit note below.    [facility] PHYSICIANS  SPECIALIZING IN BREAKTHROUGHS  Radiation Oncology    On Treatment Visit Note      Tricia Brooks      Date: 2023   MRN: [id number]   : 1945  Diagnosis: R breast DCIS ER-      Reason for Visit:  On Radiation Treatment Visit     Treatment Summary to Date  Treatment Site: R breast + bst Current Dose: 1596/5656 cGy Fractions:       Chemotherapy  Chemo concurrent with radx?: No (No Med/Onc ER-)    Subjective:     Doing well with no unexpected toxicity    Nursing ROS:   Nutrition Alteration  Diet Type: Patient's Preference  Skin  Skin Reaction: 0 - No changes  Skin Intervention: Aquaphor BID        Cardiovascular  Respiratory effort: 1 - Normal - without distress        Psychosocial  Psychosocial Note: Patient denies fatigue  Pain Assessment  0-10 Pain Scale: 0      Objective:   /71   Pulse 62   Temp 97.8  F (36.6  C) (Oral)   Resp 16   Wt 47.6 kg (105 lb)   SpO2 96%   BMI 19.20 kg/m    Gen: Appears well, in no acute distress  Skin: No erythema    Labs:  CBC RESULTS: No results for input(s): \"WBC\", \"RBC\", \"HGB\", \"HCT\", \"MCV\", \"MCH\", \"MCHC\", \"RDW\", \"PLT\" in the last 24653 hours.  ELECTROLYTES:  No results for input(s): \"NA\", \"POTASSIUM\", \"CHLORIDE\", \"RYAN\", \"CO2\", \"BUN\", \"CR\", \"GLC\" in the last 22422 hours.    Assessment:    Tolerating radiation therapy well.  All questions and concerns addressed.      Plan:   Continue current therapy.    Skin care per above.      Mosaiq chart and setup information reviewed  Ports checked    Medication Review  Med list reviewed with patient?: Yes    Educational Topic Discussed  Education Instructions: Skin cares, fatigue        Kali Khalil MD    Please do not send " letter to referring physician.        Again, thank you for allowing me to participate in the care of your patient.        Sincerely,        JOSE ANTONIO Khalil MD

## 2023-11-29 NOTE — PROGRESS NOTES
"[facility] PHYSICIANS  SPECIALIZING IN BREAKTHROUGHS  Radiation Oncology    On Treatment Visit Note      Tricia Brooks      Date: 2023   MRN: [id number]   : 1945  Diagnosis: R breast DCIS ER-      Reason for Visit:  On Radiation Treatment Visit     Treatment Summary to Date  Treatment Site: R breast + bst Current Dose: 1596/5656 cGy Fractions:       Chemotherapy  Chemo concurrent with radx?: No (No Med/Onc ER-)    Subjective:     Doing well with no unexpected toxicity    Nursing ROS:   Nutrition Alteration  Diet Type: Patient's Preference  Skin  Skin Reaction: 0 - No changes  Skin Intervention: Aquaphor BID        Cardiovascular  Respiratory effort: 1 - Normal - without distress        Psychosocial  Psychosocial Note: Patient denies fatigue  Pain Assessment  0-10 Pain Scale: 0      Objective:   /71   Pulse 62   Temp 97.8  F (36.6  C) (Oral)   Resp 16   Wt 47.6 kg (105 lb)   SpO2 96%   BMI 19.20 kg/m    Gen: Appears well, in no acute distress  Skin: No erythema    Labs:  CBC RESULTS: No results for input(s): \"WBC\", \"RBC\", \"HGB\", \"HCT\", \"MCV\", \"MCH\", \"MCHC\", \"RDW\", \"PLT\" in the last 44851 hours.  ELECTROLYTES:  No results for input(s): \"NA\", \"POTASSIUM\", \"CHLORIDE\", \"RYAN\", \"CO2\", \"BUN\", \"CR\", \"GLC\" in the last 60187 hours.    Assessment:    Tolerating radiation therapy well.  All questions and concerns addressed.      Plan:   Continue current therapy.    Skin care per above.      Mosaiq chart and setup information reviewed  Ports checked    Medication Review  Med list reviewed with patient?: Yes    Educational Topic Discussed  Education Instructions: Skin cares, fatigue        Kali Khalil MD    Please do not send letter to referring physician.      "

## 2023-11-30 ENCOUNTER — APPOINTMENT (OUTPATIENT)
Dept: RADIATION ONCOLOGY | Facility: CLINIC | Age: 78
End: 2023-11-30
Payer: COMMERCIAL

## 2023-11-30 PROCEDURE — 77412 RADIATION TX DELIVERY LVL 3: CPT | Performed by: SURGERY

## 2023-12-01 ENCOUNTER — APPOINTMENT (OUTPATIENT)
Dept: RADIATION ONCOLOGY | Facility: CLINIC | Age: 78
End: 2023-12-01
Payer: COMMERCIAL

## 2023-12-01 PROCEDURE — 77412 RADIATION TX DELIVERY LVL 3: CPT | Performed by: SURGERY

## 2023-12-04 ENCOUNTER — APPOINTMENT (OUTPATIENT)
Dept: RADIATION ONCOLOGY | Facility: CLINIC | Age: 78
End: 2023-12-04
Payer: COMMERCIAL

## 2023-12-04 PROCEDURE — 77412 RADIATION TX DELIVERY LVL 3: CPT | Performed by: RADIOLOGY

## 2023-12-05 ENCOUNTER — APPOINTMENT (OUTPATIENT)
Dept: RADIATION ONCOLOGY | Facility: CLINIC | Age: 78
End: 2023-12-05
Payer: COMMERCIAL

## 2023-12-05 PROCEDURE — 77417 THER RADIOLOGY PORT IMAGE(S): CPT | Performed by: RADIOLOGY

## 2023-12-05 PROCEDURE — 77412 RADIATION TX DELIVERY LVL 3: CPT | Performed by: RADIOLOGY

## 2023-12-05 PROCEDURE — 77334 RADIATION TREATMENT AID(S): CPT | Performed by: RADIOLOGY

## 2023-12-05 PROCEDURE — 77427 RADIATION TX MANAGEMENT X5: CPT | Performed by: RADIOLOGY

## 2023-12-05 PROCEDURE — 77307 TELETHX ISODOSE PLAN CPLX: CPT | Performed by: RADIOLOGY

## 2023-12-05 PROCEDURE — 77336 RADIATION PHYSICS CONSULT: CPT | Mod: 59 | Performed by: RADIOLOGY

## 2023-12-06 ENCOUNTER — APPOINTMENT (OUTPATIENT)
Dept: RADIATION ONCOLOGY | Facility: CLINIC | Age: 78
End: 2023-12-06
Payer: COMMERCIAL

## 2023-12-06 ENCOUNTER — OFFICE VISIT (OUTPATIENT)
Dept: RADIATION ONCOLOGY | Facility: CLINIC | Age: 78
End: 2023-12-06
Payer: COMMERCIAL

## 2023-12-06 VITALS
DIASTOLIC BLOOD PRESSURE: 83 MMHG | BODY MASS INDEX: 19.24 KG/M2 | TEMPERATURE: 97.9 F | RESPIRATION RATE: 16 BRPM | SYSTOLIC BLOOD PRESSURE: 151 MMHG | HEART RATE: 66 BPM | OXYGEN SATURATION: 96 % | WEIGHT: 105.2 LBS

## 2023-12-06 DIAGNOSIS — Z17.1 MALIGNANT NEOPLASM OF UPPER-OUTER QUADRANT OF RIGHT BREAST IN FEMALE, ESTROGEN RECEPTOR NEGATIVE (H): Primary | ICD-10-CM

## 2023-12-06 DIAGNOSIS — C50.411 MALIGNANT NEOPLASM OF UPPER-OUTER QUADRANT OF RIGHT BREAST IN FEMALE, ESTROGEN RECEPTOR NEGATIVE (H): Primary | ICD-10-CM

## 2023-12-06 PROCEDURE — 99207 PR DROP WITH A PROCEDURE: CPT | Performed by: RADIOLOGY

## 2023-12-06 PROCEDURE — 77280 THER RAD SIMULAJ FIELD SMPL: CPT | Performed by: RADIOLOGY

## 2023-12-06 PROCEDURE — 77412 RADIATION TX DELIVERY LVL 3: CPT | Performed by: RADIOLOGY

## 2023-12-06 ASSESSMENT — PAIN SCALES - GENERAL: PAINLEVEL: NO PAIN (0)

## 2023-12-06 NOTE — LETTER
"    2023         RE: Tricia Brooks  [address]  [place] [address]        Dear Colleague,    Thank you for referring your patient, Tricia Brooks, to the [facility] RADIATION ONCOLOGY [place]. Please see a copy of my visit note below.    [facility] PHYSICIANS  SPECIALIZING IN BREAKTHROUGHS  Radiation Oncology    On Treatment Visit Note      Tricia Brooks      Date: 2023   MRN: [id number]   : 1945  Diagnosis: R breast DCIS ER-      Reason for Visit:  On Radiation Treatment Visit     Treatment Summary to Date  Treatment Site: R breast + bst Current Dose: 2926/5656 cGy Fractions:       Chemotherapy  Chemo concurrent with radx?: No (No Med/Onc ER-)    Subjective:     Doing well with minimal side effects    Nursing ROS:   Nutrition Alteration  Diet Type: Patient's Preference  Skin  Skin Reaction: 1 - Faint erythema or dry desquamation (no desquamation)  Skin Intervention: Aquaphor BID  Skin Note: Rash to upper R chest- start Hydrocortisone per Dr. Khalil and continue Aquaphor BID.        Cardiovascular  Respiratory effort: 1 - Normal - without distress        Psychosocial  Psychosocial Note: Patient denies fatigue  Pain Assessment  0-10 Pain Scale: 0      Objective:   BP (!) 151/83   Pulse 66   Temp 97.9  F (36.6  C) (Oral)   Resp 16   Wt 47.7 kg (105 lb 3.2 oz)   SpO2 96%   BMI 19.24 kg/m    Gen: Appears well, in no acute distress  Skin: Mild diffuse erythema over treatment field, no desquamation    Labs:  CBC RESULTS: No results for input(s): \"WBC\", \"RBC\", \"HGB\", \"HCT\", \"MCV\", \"MCH\", \"MCHC\", \"RDW\", \"PLT\" in the last 52512 hours.  ELECTROLYTES:  No results for input(s): \"NA\", \"POTASSIUM\", \"CHLORIDE\", \"RYAN\", \"CO2\", \"BUN\", \"CR\", \"GLC\" in the last 76307 hours.    Assessment:    Tolerating radiation therapy well.  All questions and concerns addressed.    Toxicities:  Fatigue: Grade 0: No toxicity  Pain: Grade 0: No toxicity  Dermatitis: Grade 1: Faint erythema " or dry desquamation    Plan:   Continue current therapy.    Skin care per above      Mosaiq chart and setup information reviewed  Ports checked    Medication Review  Med list reviewed with patient?: Yes    Educational Topic Discussed  Education Instructions: Skin cares, fatigue        Kali Khalil MD    Please do not send letter to referring physician.        Again, thank you for allowing me to participate in the care of your patient.        Sincerely,        JOSE ANTONIO Khalil MD

## 2023-12-06 NOTE — PROGRESS NOTES
"[facility] PHYSICIANS  SPECIALIZING IN BREAKTHROUGHS  Radiation Oncology    On Treatment Visit Note      Tricia Brooks      Date: 2023   MRN: [id number]   : 1945  Diagnosis: R breast DCIS ER-      Reason for Visit:  On Radiation Treatment Visit     Treatment Summary to Date  Treatment Site: R breast + bst Current Dose: 2926/5656 cGy Fractions:       Chemotherapy  Chemo concurrent with radx?: No (No Med/Onc ER-)    Subjective:     Doing well with minimal side effects    Nursing ROS:   Nutrition Alteration  Diet Type: Patient's Preference  Skin  Skin Reaction: 1 - Faint erythema or dry desquamation (no desquamation)  Skin Intervention: Aquaphor BID  Skin Note: Rash to upper R chest- start Hydrocortisone per Dr. Khalil and continue Aquaphor BID.        Cardiovascular  Respiratory effort: 1 - Normal - without distress        Psychosocial  Psychosocial Note: Patient denies fatigue  Pain Assessment  0-10 Pain Scale: 0      Objective:   BP (!) 151/83   Pulse 66   Temp 97.9  F (36.6  C) (Oral)   Resp 16   Wt 47.7 kg (105 lb 3.2 oz)   SpO2 96%   BMI 19.24 kg/m    Gen: Appears well, in no acute distress  Skin: Mild diffuse erythema over treatment field, no desquamation    Labs:  CBC RESULTS: No results for input(s): \"WBC\", \"RBC\", \"HGB\", \"HCT\", \"MCV\", \"MCH\", \"MCHC\", \"RDW\", \"PLT\" in the last 22414 hours.  ELECTROLYTES:  No results for input(s): \"NA\", \"POTASSIUM\", \"CHLORIDE\", \"RYAN\", \"CO2\", \"BUN\", \"CR\", \"GLC\" in the last 97831 hours.    Assessment:    Tolerating radiation therapy well.  All questions and concerns addressed.    Toxicities:  Fatigue: Grade 0: No toxicity  Pain: Grade 0: No toxicity  Dermatitis: Grade 1: Faint erythema or dry desquamation    Plan:   Continue current therapy.    Skin care per above      Mosaiq chart and setup information reviewed  Ports checked    Medication Review  Med list reviewed with patient?: Yes    Educational Topic Discussed  Education Instructions: Skin " sandie rodas MD    Please do not send letter to referring physician.

## 2023-12-07 ENCOUNTER — APPOINTMENT (OUTPATIENT)
Dept: RADIATION ONCOLOGY | Facility: CLINIC | Age: 78
End: 2023-12-07
Payer: COMMERCIAL

## 2023-12-07 PROCEDURE — 77412 RADIATION TX DELIVERY LVL 3: CPT | Performed by: SURGERY

## 2023-12-08 ENCOUNTER — APPOINTMENT (OUTPATIENT)
Dept: RADIATION ONCOLOGY | Facility: CLINIC | Age: 78
End: 2023-12-08
Payer: COMMERCIAL

## 2023-12-08 PROCEDURE — 77412 RADIATION TX DELIVERY LVL 3: CPT | Performed by: SURGERY

## 2023-12-11 ENCOUNTER — APPOINTMENT (OUTPATIENT)
Dept: RADIATION ONCOLOGY | Facility: CLINIC | Age: 78
End: 2023-12-11
Payer: COMMERCIAL

## 2023-12-11 PROCEDURE — 77412 RADIATION TX DELIVERY LVL 3: CPT | Performed by: RADIOLOGY

## 2023-12-12 ENCOUNTER — APPOINTMENT (OUTPATIENT)
Dept: RADIATION ONCOLOGY | Facility: CLINIC | Age: 78
End: 2023-12-12
Payer: COMMERCIAL

## 2023-12-12 PROCEDURE — 77427 RADIATION TX MANAGEMENT X5: CPT | Performed by: RADIOLOGY

## 2023-12-12 PROCEDURE — 77412 RADIATION TX DELIVERY LVL 3: CPT | Performed by: RADIOLOGY

## 2023-12-12 PROCEDURE — 77336 RADIATION PHYSICS CONSULT: CPT | Mod: 59 | Performed by: RADIOLOGY

## 2023-12-13 ENCOUNTER — OFFICE VISIT (OUTPATIENT)
Dept: RADIATION ONCOLOGY | Facility: CLINIC | Age: 78
End: 2023-12-13
Payer: COMMERCIAL

## 2023-12-13 ENCOUNTER — APPOINTMENT (OUTPATIENT)
Dept: RADIATION ONCOLOGY | Facility: CLINIC | Age: 78
End: 2023-12-13
Payer: COMMERCIAL

## 2023-12-13 VITALS
OXYGEN SATURATION: 98 % | BODY MASS INDEX: 19.2 KG/M2 | SYSTOLIC BLOOD PRESSURE: 126 MMHG | TEMPERATURE: 98.2 F | HEART RATE: 61 BPM | DIASTOLIC BLOOD PRESSURE: 82 MMHG | WEIGHT: 105 LBS | RESPIRATION RATE: 16 BRPM

## 2023-12-13 DIAGNOSIS — C50.411 MALIGNANT NEOPLASM OF UPPER-OUTER QUADRANT OF RIGHT BREAST IN FEMALE, ESTROGEN RECEPTOR NEGATIVE (H): Primary | ICD-10-CM

## 2023-12-13 DIAGNOSIS — Z17.1 MALIGNANT NEOPLASM OF UPPER-OUTER QUADRANT OF RIGHT BREAST IN FEMALE, ESTROGEN RECEPTOR NEGATIVE (H): Primary | ICD-10-CM

## 2023-12-13 PROCEDURE — 77412 RADIATION TX DELIVERY LVL 3: CPT | Performed by: RADIOLOGY

## 2023-12-13 PROCEDURE — 99207 PR DROP WITH A PROCEDURE: CPT | Performed by: RADIOLOGY

## 2023-12-13 ASSESSMENT — PAIN SCALES - GENERAL: PAINLEVEL: NO PAIN (0)

## 2023-12-13 NOTE — LETTER
"    2023         RE: Tricia Brooks  [address]  [place] [address]        Dear Colleague,    Thank you for referring your patient, Tricia Brooks, to the [facility] RADIATION ONCOLOGY [place]. Please see a copy of my visit note below.    [facility] PHYSICIANS  SPECIALIZING IN BREAKTHROUGHS  Radiation Oncology    On Treatment Visit Note      Tricia Brooks      Date: 2023   MRN: [id number]   : 1945  Diagnosis: R breast DCIS ER-      Reason for Visit:  On Radiation Treatment Visit     Treatment Summary to Date  Treatment Site: R breast + bst Current Dose: 4256/5656 cGy Fractions:       Chemotherapy  Chemo concurrent with radx?: No (No Med/Onc ER-)    Subjective:     Doing well with expected side effects    Nursing ROS:   Nutrition Alteration  Diet Type: Patient's Preference  Skin  Skin Reaction: 1 - Faint erythema or dry desquamation (no desquamation)  Skin Intervention: Continue Hydrocortisone 2.5% BID and Aquaphor BID  Skin Note: Rash to upper R chest        Cardiovascular  Respiratory effort: 1 - Normal - without distress        Psychosocial  Psychosocial Note: Patient denies fatigue  Pain Assessment  0-10 Pain Scale: 0      Objective:   /82   Pulse 61   Temp 98.2  F (36.8  C) (Oral)   Resp 16   Wt 47.6 kg (105 lb)   SpO2 98%   BMI 19.20 kg/m    Gen: Appears well, in no acute distress  Skin: Mild diffuse erythema over treatment field, no desquamation    Labs:  CBC RESULTS: No results for input(s): \"WBC\", \"RBC\", \"HGB\", \"HCT\", \"MCV\", \"MCH\", \"MCHC\", \"RDW\", \"PLT\" in the last 72138 hours.  ELECTROLYTES:  No results for input(s): \"NA\", \"POTASSIUM\", \"CHLORIDE\", \"RYAN\", \"CO2\", \"BUN\", \"CR\", \"GLC\" in the last 50842 hours.    Assessment:    Tolerating radiation therapy well.  All questions and concerns addressed.    Toxicities:  Fatigue: Grade 0: No toxicity  Pain: Grade 0: No toxicity  Dermatitis: Grade 1: Faint erythema or dry desquamation     Plan: "   Continue current therapy.    Skin care per above      Mosaiq chart and setup information reviewed  Ports checked    Medication Review  Med list reviewed with patient?: Yes    Educational Topic Discussed  Education Instructions: Skin cares, fatigue        Kali Khalil MD    Please do not send letter to referring physician.        Again, thank you for allowing me to participate in the care of your patient.        Sincerely,        JOSE ANTONIO Khalil MD

## 2023-12-14 ENCOUNTER — APPOINTMENT (OUTPATIENT)
Dept: RADIATION ONCOLOGY | Facility: CLINIC | Age: 78
End: 2023-12-14
Payer: COMMERCIAL

## 2023-12-14 PROCEDURE — 77412 RADIATION TX DELIVERY LVL 3: CPT | Performed by: RADIOLOGY

## 2023-12-14 PROCEDURE — 77417 THER RADIOLOGY PORT IMAGE(S): CPT | Performed by: RADIOLOGY

## 2023-12-14 NOTE — PROGRESS NOTES
"[facility] PHYSICIANS  SPECIALIZING IN BREAKTHROUGHS  Radiation Oncology    On Treatment Visit Note      Tricia Brooks      Date: 2023   MRN: [id number]   : 1945  Diagnosis: R breast DCIS ER-      Reason for Visit:  On Radiation Treatment Visit     Treatment Summary to Date  Treatment Site: R breast + bst Current Dose: 4256/5656 cGy Fractions:       Chemotherapy  Chemo concurrent with radx?: No (No Med/Onc ER-)    Subjective:     Doing well with expected side effects    Nursing ROS:   Nutrition Alteration  Diet Type: Patient's Preference  Skin  Skin Reaction: 1 - Faint erythema or dry desquamation (no desquamation)  Skin Intervention: Continue Hydrocortisone 2.5% BID and Aquaphor BID  Skin Note: Rash to upper R chest        Cardiovascular  Respiratory effort: 1 - Normal - without distress        Psychosocial  Psychosocial Note: Patient denies fatigue  Pain Assessment  0-10 Pain Scale: 0      Objective:   /82   Pulse 61   Temp 98.2  F (36.8  C) (Oral)   Resp 16   Wt 47.6 kg (105 lb)   SpO2 98%   BMI 19.20 kg/m    Gen: Appears well, in no acute distress  Skin: Mild diffuse erythema over treatment field, no desquamation    Labs:  CBC RESULTS: No results for input(s): \"WBC\", \"RBC\", \"HGB\", \"HCT\", \"MCV\", \"MCH\", \"MCHC\", \"RDW\", \"PLT\" in the last 54054 hours.  ELECTROLYTES:  No results for input(s): \"NA\", \"POTASSIUM\", \"CHLORIDE\", \"RYAN\", \"CO2\", \"BUN\", \"CR\", \"GLC\" in the last 68474 hours.    Assessment:    Tolerating radiation therapy well.  All questions and concerns addressed.    Toxicities:  Fatigue: Grade 0: No toxicity  Pain: Grade 0: No toxicity  Dermatitis: Grade 1: Faint erythema or dry desquamation     Plan:   Continue current therapy.    Skin care per above      Mosaiq chart and setup information reviewed  Ports checked    Medication Review  Med list reviewed with patient?: Yes    Educational Topic Discussed  Education Instructions: Skin cares, fatigue        Kali Khalil" MD    Please do not send letter to referring physician.

## 2023-12-15 ENCOUNTER — APPOINTMENT (OUTPATIENT)
Dept: RADIATION ONCOLOGY | Facility: CLINIC | Age: 78
End: 2023-12-15
Payer: COMMERCIAL

## 2023-12-15 PROCEDURE — 77412 RADIATION TX DELIVERY LVL 3: CPT | Performed by: SURGERY

## 2023-12-18 ENCOUNTER — APPOINTMENT (OUTPATIENT)
Dept: RADIATION ONCOLOGY | Facility: CLINIC | Age: 78
End: 2023-12-18
Payer: COMMERCIAL

## 2023-12-18 PROCEDURE — 77412 RADIATION TX DELIVERY LVL 3: CPT | Performed by: RADIOLOGY

## 2023-12-19 ENCOUNTER — APPOINTMENT (OUTPATIENT)
Dept: RADIATION ONCOLOGY | Facility: CLINIC | Age: 78
End: 2023-12-19
Payer: COMMERCIAL

## 2023-12-19 PROCEDURE — 77427 RADIATION TX MANAGEMENT X5: CPT | Performed by: RADIOLOGY

## 2023-12-19 PROCEDURE — 77336 RADIATION PHYSICS CONSULT: CPT | Performed by: RADIOLOGY

## 2023-12-19 PROCEDURE — 77412 RADIATION TX DELIVERY LVL 3: CPT | Performed by: RADIOLOGY

## 2023-12-20 ENCOUNTER — APPOINTMENT (OUTPATIENT)
Dept: RADIATION ONCOLOGY | Facility: CLINIC | Age: 78
End: 2023-12-20
Payer: COMMERCIAL

## 2023-12-20 ENCOUNTER — OFFICE VISIT (OUTPATIENT)
Dept: RADIATION ONCOLOGY | Facility: CLINIC | Age: 78
End: 2023-12-20
Payer: COMMERCIAL

## 2023-12-20 VITALS
RESPIRATION RATE: 18 BRPM | HEART RATE: 58 BPM | BODY MASS INDEX: 19.57 KG/M2 | TEMPERATURE: 97.9 F | WEIGHT: 107 LBS | OXYGEN SATURATION: 96 % | SYSTOLIC BLOOD PRESSURE: 163 MMHG | DIASTOLIC BLOOD PRESSURE: 81 MMHG

## 2023-12-20 DIAGNOSIS — Z17.1 MALIGNANT NEOPLASM OF UPPER-OUTER QUADRANT OF RIGHT BREAST IN FEMALE, ESTROGEN RECEPTOR NEGATIVE (H): Primary | ICD-10-CM

## 2023-12-20 DIAGNOSIS — C50.411 MALIGNANT NEOPLASM OF UPPER-OUTER QUADRANT OF RIGHT BREAST IN FEMALE, ESTROGEN RECEPTOR NEGATIVE (H): Primary | ICD-10-CM

## 2023-12-20 PROCEDURE — 99207 PR DROP WITH A PROCEDURE: CPT | Performed by: RADIOLOGY

## 2023-12-20 PROCEDURE — 77412 RADIATION TX DELIVERY LVL 3: CPT | Performed by: RADIOLOGY

## 2023-12-20 ASSESSMENT — PAIN SCALES - GENERAL: PAINLEVEL: NO PAIN (0)

## 2023-12-20 NOTE — PROGRESS NOTES
"[facility] PHYSICIANS  SPECIALIZING IN BREAKTHROUGHS  Radiation Oncology    On Treatment Visit Note      Tricia Brooks      Date: 2023   MRN: [id number]   : 1945  Diagnosis: R breast DCIS ER-      Reason for Visit:  On Radiation Treatment Visit     Treatment Summary to Date  Treatment Site: R breast + bst Current Dose: 5256/5656 cGy Fractions:       Chemotherapy  Chemo concurrent with radx?: No (No Med/Onc ER-)    Subjective:     More skin reaction this week.  However skin is intact.    Nursing ROS:   Nutrition Alteration  Diet Type: Patient's Preference  Skin  Skin Reaction: 2  Skin Intervention: Continue Hydrocortisone ointment 1% BID x 1 week post XRT, then stop. Do not apply to areas that may peel- bacitracin or neosporin to peeled areas. Continue Aquaphor BID until skin has healed.  Skin Note: Rash to upper R chest        Cardiovascular  Respiratory effort: 1 - Normal - without distress        Psychosocial  Psychosocial Note: Patient denies fatigue  Pain Assessment  0-10 Pain Scale: 0      Objective:   BP (!) 163/81   Pulse 58   Temp 97.9  F (36.6  C) (Oral)   Resp 18   Wt 48.5 kg (107 lb)   SpO2 96%   BMI 19.57 kg/m    Gen: Appears well, in no acute distress  Skin: Moderate diffuse erythema over treatment field, no desquamation    Labs:  CBC RESULTS: No results for input(s): \"WBC\", \"RBC\", \"HGB\", \"HCT\", \"MCV\", \"MCH\", \"MCHC\", \"RDW\", \"PLT\" in the last 84401 hours.  ELECTROLYTES:  No results for input(s): \"NA\", \"POTASSIUM\", \"CHLORIDE\", \"RYAN\", \"CO2\", \"BUN\", \"CR\", \"GLC\" in the last 98918 hours.    Assessment:    Tolerating radiation therapy well.  All questions and concerns addressed.    Toxicities:  Dermatitis: Grade 2: Moderate to brisk erythema; patchy moist desquamation, mostly confined to skin folds and creases; moderate erythema    Plan:   Continue current therapy.    Skin care per above   follow-up per protocol      Mosaiq chart and setup information reviewed  Ports " checked    Medication Review  Med list reviewed with patient?: Yes    Educational Topic Discussed  Additional Instructions: Follow up with Celine Perez PA-C in 1 month, follow up with Dr. Roche with repeat mammogram as scheduled Aug. 2024.  Education Instructions: Skin cares, fatigue        Kali Khalil MD    Please do not send letter to referring physician.

## 2023-12-20 NOTE — LETTER
"    2023         RE: Tricia Brooks  [address]  [place] [address]        Dear Colleague,    Thank you for referring your patient, Tricia Brooks, to the [facility] RADIATION ONCOLOGY [place]. Please see a copy of my visit note below.    [facility] PHYSICIANS  SPECIALIZING IN BREAKTHROUGHS  Radiation Oncology    On Treatment Visit Note      Tricia Brooks      Date: 2023   MRN: [id number]   : 1945  Diagnosis: R breast DCIS ER-      Reason for Visit:  On Radiation Treatment Visit     Treatment Summary to Date  Treatment Site: R breast + bst Current Dose: 5256/5656 cGy Fractions:       Chemotherapy  Chemo concurrent with radx?: No (No Med/Onc ER-)    Subjective:     More skin reaction this week.  However skin is intact.    Nursing ROS:   Nutrition Alteration  Diet Type: Patient's Preference  Skin  Skin Reaction: 2  Skin Intervention: Continue Hydrocortisone ointment 1% BID x 1 week post XRT, then stop. Do not apply to areas that may peel- bacitracin or neosporin to peeled areas. Continue Aquaphor BID until skin has healed.  Skin Note: Rash to upper R chest        Cardiovascular  Respiratory effort: 1 - Normal - without distress        Psychosocial  Psychosocial Note: Patient denies fatigue  Pain Assessment  0-10 Pain Scale: 0      Objective:   BP (!) 163/81   Pulse 58   Temp 97.9  F (36.6  C) (Oral)   Resp 18   Wt 48.5 kg (107 lb)   SpO2 96%   BMI 19.57 kg/m    Gen: Appears well, in no acute distress  Skin: Moderate diffuse erythema over treatment field, no desquamation    Labs:  CBC RESULTS: No results for input(s): \"WBC\", \"RBC\", \"HGB\", \"HCT\", \"MCV\", \"MCH\", \"MCHC\", \"RDW\", \"PLT\" in the last 03359 hours.  ELECTROLYTES:  No results for input(s): \"NA\", \"POTASSIUM\", \"CHLORIDE\", \"RYAN\", \"CO2\", \"BUN\", \"CR\", \"GLC\" in the last 55843 hours.    Assessment:    Tolerating radiation therapy well.  All questions and concerns addressed.    Toxicities:  Dermatitis: " Grade 2: Moderate to brisk erythema; patchy moist desquamation, mostly confined to skin folds and creases; moderate erythema    Plan:   Continue current therapy.    Skin care per above   follow-up per protocol      Mosaiq chart and setup information reviewed  Ports checked    Medication Review  Med list reviewed with patient?: Yes    Educational Topic Discussed  Additional Instructions: Follow up with Celine Perez PA-C in 1 month, follow up with Dr. Roche with repeat mammogram as scheduled Aug. 2024.  Education Instructions: Skin cares, fatigue        Kali Khalil MD    Please do not send letter to referring physician.        Again, thank you for allowing me to participate in the care of your patient.        Sincerely,        JOSE ANTONIO Khalil MD

## 2023-12-21 ENCOUNTER — APPOINTMENT (OUTPATIENT)
Dept: RADIATION ONCOLOGY | Facility: CLINIC | Age: 78
End: 2023-12-21
Payer: COMMERCIAL

## 2023-12-21 PROCEDURE — 77412 RADIATION TX DELIVERY LVL 3: CPT | Performed by: SURGERY

## 2023-12-22 ENCOUNTER — APPOINTMENT (OUTPATIENT)
Dept: RADIATION ONCOLOGY | Facility: CLINIC | Age: 78
End: 2023-12-22
Payer: COMMERCIAL

## 2023-12-22 PROCEDURE — 77412 RADIATION TX DELIVERY LVL 3: CPT | Performed by: SURGERY

## 2023-12-22 PROCEDURE — 77427 RADIATION TX MANAGEMENT X5: CPT | Performed by: RADIOLOGY

## 2023-12-22 PROCEDURE — 77336 RADIATION PHYSICS CONSULT: CPT | Performed by: SURGERY

## 2023-12-28 ENCOUNTER — DOCUMENTATION ONLY (OUTPATIENT)
Dept: RADIATION ONCOLOGY | Facility: CLINIC | Age: 78
End: 2023-12-28
Payer: COMMERCIAL

## 2023-12-28 PROCEDURE — 99207 PR NO BILLABLE SERVICE THIS VISIT: CPT | Performed by: RADIOLOGY

## 2023-12-28 NOTE — PROGRESS NOTES
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       INTENT OF RADIOTHERAPY: Adjuvant    CONCURRENT SYSTEMIC THERAPY: No             SITE OF TREATMENT: Right breast    DATES  OF TREATMENT: 2023 to 2023    TOTAL DOSE OF TREATMENT / FRACTIONS: 4256 cGy in 266 cGy daily fractions to the right breast followed by a 1400 cGy in 200cGy daily fraction tumor bed boost    TOXICITY: No unexpected radiation-induced toxicity encountered                                          FOLLOW UP PLAN:  Follow up with Radiation Oncology in 4 to 6 weeks  Follow up with medical oncology as previously directed    CC  Patient Care Team:  Iman Jones PCP - General (Internal Medicine)         Kali Khalil M.D.  Department of Radiation Oncology  [facility]

## 2024-02-07 ENCOUNTER — VIRTUAL VISIT (OUTPATIENT)
Dept: RADIATION ONCOLOGY | Facility: CLINIC | Age: 79
End: 2024-02-07
Payer: COMMERCIAL

## 2024-02-07 DIAGNOSIS — Z17.1 MALIGNANT NEOPLASM OF UPPER-OUTER QUADRANT OF RIGHT BREAST IN FEMALE, ESTROGEN RECEPTOR NEGATIVE (H): Primary | ICD-10-CM

## 2024-02-07 DIAGNOSIS — C50.411 MALIGNANT NEOPLASM OF UPPER-OUTER QUADRANT OF RIGHT BREAST IN FEMALE, ESTROGEN RECEPTOR NEGATIVE (H): Primary | ICD-10-CM

## 2024-02-07 PROCEDURE — 99024 POSTOP FOLLOW-UP VISIT: CPT | Mod: 95 | Performed by: RADIOLOGY

## 2024-02-07 NOTE — PROGRESS NOTES
Dear Colleagues,  Today Tricia Brooks was seen in follow up via video.      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     INTERVAL HISTORY:  Tricia Brooks was last seen in our clinic during her last week of treatment. While on treatment she developed diffuse skin erythema (grade 2).  Post treatment she states that all of her symptoms have resolved. She is seen today via video in follow up and has no complaints. Specifically denies n/v/ha/sob/cp.  REVIEW OF SYSTEMS: As per HPI, a 14-point review of systems is otherwise negative.     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 baseline creatinine nl, urine (-)protein 2013 transiently abnl eGFR, repeat GFR nl.  Urine micro nl.  Umicroalb nl.  24 hr ABPM    Decreased GFR 11/07/2023    Formatting of this note is different from the original. 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 (1) 1998 EGD; Rx HPylori (2) 12/2016:  anemia dx at the time of her usual 90 day donation, had been nl at 8/2016 donation.  Ferritin low.  Following o/w nl evaluation, attributed to blood donation - 1/9/2017 colonoscopy and EGD w/ SB bx normal ([facility], Dr Marlene Burkett)  No fam hx CRC - 1/13/2017 BM bx and peripheral blood c/w iron depletion on     Gross hematuria 11/07/2023    Formatting of this note might be different from the original. 12/2020 phone appt Dr Crow following 2 days c/w gross hematuria.  UA after  "signs cleared normal 4/2021 CTU+cysto w/ Dr Stark unrevealing    History of pneumonia 11/07/2023    Formatting of this note might be different from the original.    Hyperlipidemia 11/07/2023    Formatting of this note might be different from the original. Dx 33 yo.  Started pharmcorx @ 53 yo 4/02 EBCT \" nl;\"   10/02 exercise stress test \"nl\" mother MI (d) 57 or 59 yo sister MI (d) 53 yo-tobacco brother (d) MI 80 yo-tobacco brother (d) MI 61 yo    Hypertension 11/07/2023    Formatting of this note is different from the original.   2000 dx w/ her [facility] PMD and started Lotrel 5/20 at that time   2013 ABPM 24 hr 20% dipping, stop HCTZ and continue ACEi    NHL (non-Hodgkin's lymphoma) (H) 11/07/2023    Formatting of this note is different from the original.   12/20/2016 OV for 1 mo left groin painless nodule + new anemia dx at her intended 3 mo blood donation     12/27/2016 u/s core bx: follicular lymphoma, WHO grade 1-2 in that sampling   1/2017 consult Dr Nav RENEE   1/3/2017 staging PET CT skull base to mid thigh w/ mildly enlarged left ing node (SUV max 4.6), o/w nl   1/13/2017     Paroxysmal atrial fibrillation (H) 08/17/2023    Pulsatile tinnitus 06/02/2018    Formatting of this note might be different from the original. Onset~66 yo. ENT, audiology eval unrevealing, worse when supine       Past Surgical History:   Procedure Laterality Date    IR LYMPH NODE BIOPSY  12/27/2016       No family history on file.    Social History     Tobacco Use    Smoking status: Never    Smokeless tobacco: Never   Substance Use Topics    Alcohol use: Yes     Comment: 1 drink per day       Current Outpatient Medications   Medication    atorvastatin (LIPITOR) 40 MG tablet    benazepril (LOTENSIN) 10 MG tablet    Cholecalciferol (VITAMIN D-3 PO)    Cyanocobalamin (VITAMIN B-12 PO)    ELIQUIS ANTICOAGULANT 5 MG tablet    gabapentin (NEURONTIN) 300 MG capsule    Glucosamine-Chondroitin (GLUCOSAMINE CHONDR COMPLEX PO)    " SODIUM FLUORIDE 5000 PPM 1.1 % PSTE dental paste    vitamin C (ASCORBIC ACID) 500 MG tablet     No current facility-administered medications for this visit.        No Known Allergies     PHYSICAL EXAMINATION:  GENERAL: alert and no distress  EYES: Eyes grossly normal to inspection.  No discharge or erythema, or obvious scleral/conjunctival abnormalities.  RESP: No audible wheeze, cough, or visible cyanosis.    SKIN: Visible skin clear. No significant rash, abnormal pigmentation or lesions within prior radiation treatment field.  NEURO: Cranial nerves grossly intact.  Mentation and speech appropriate for age.  PSYCH: Appropriate affect, tone, and pace of words    IMAGING: No new imaging.        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 She is being seen here in follow up. 10 year risk of recurrence decreased from 33% to 14% as she completed adjuvant XRT.  She has had resolution of her acute radiation induced side effects and only has some residual skin dryness and hyperpigmentation within the treatment field.  This is to be expected. She should continue to use sunblock and moisturizer in the previously treated area generously.   Additional problem list to be addressed in the following manner:  1) Systemic/hormonal treatment: none recommended as pt was ER-.  2) Follow up with Surgery as previously directed.  Scheduled for [facility] throught their clinic in August 2024.  3) Follow up with Rad Onc prn.  There was ample time for questions and all were answered to the patient's satisfaction. Thank you for allowing me to participate in the care of this pleasant patient. If you have any questions, please do not hesitate to contact my office.      Sincerely,  Kali Khalil  MD  Adjunct   Dept of Radiation Oncology  [facility]    Joined the call at 2/7/2024, 9:03:08 am.  Left the call at 2/7/2024, 9:11:57 am.

## 2024-03-04 NOTE — NURSING NOTE
Tricia is a 78 year old who is being evaluated via a billable video visit.      How would you like to obtain your AVS? MyChart  If the video visit is dropped, the invitation should be resent by: Text to cell phone: [phone]  Will anyone else be joining your video visit? No        Video-Visit Details    Type of service:  Video Visit   Video Start Time:  Per MD  Video End Time: Per MD    Originating Location (pt. Location): Home    Distant Location (provider location):  On-site  Platform used for Video Visit: [facility]    FOLLOW-UP VISIT    Patient Name: Tricia Brooks      : 1945     Age: 78 year old        ______________________________________________________________________________     Chief Complaint   Patient presents with    Radiation Therapy     Video Return appointment with Dr. Khalil     There were no vitals taken for this visit.     Date Radiation Completed: 23    Pain  Denies    Meds  Current Med List Reviewed: Yes  Medication Note:     Skin: Warm  Dry  Intact  Using daily moisturizer to radiation site    Range of Motion: Full    Respiratory: No shortness of breath, dyspnea on exertion, cough, or hemoptysis    Hormone Therapy: No    Lymphedema Follow up: No    Energy Level: normal      Appointments:     DATE  Oncologist: None (ER-)    Surgeon: Dr. Roche  Aug. 2024   Primary:      Other Notes:  Follow up with Dr. Roche for repeat mammogram as scheduled Aug. 2024. Follow up with Dr. Remi LEYVA.    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  Dec in hearing since 9/15/22 however, note retracted tymp AS: Type C/Ad WNL, 250-1000 Hz,  mild to mod-seere SNHL, 8598-8360 Hz no change in hearing since 9/15/22 REC: ENT f/u, re-eval per MD

## 2024-10-20 ENCOUNTER — HEALTH MAINTENANCE LETTER (OUTPATIENT)
Age: 79
End: 2024-10-20